# Patient Record
Sex: MALE | Race: WHITE | NOT HISPANIC OR LATINO | Employment: FULL TIME | URBAN - METROPOLITAN AREA
[De-identification: names, ages, dates, MRNs, and addresses within clinical notes are randomized per-mention and may not be internally consistent; named-entity substitution may affect disease eponyms.]

---

## 2019-08-19 ENCOUNTER — OFFICE VISIT (OUTPATIENT)
Dept: URGENT CARE | Facility: PHYSICIAN GROUP | Age: 24
End: 2019-08-19
Payer: COMMERCIAL

## 2019-08-19 VITALS
SYSTOLIC BLOOD PRESSURE: 122 MMHG | WEIGHT: 150 LBS | OXYGEN SATURATION: 100 % | DIASTOLIC BLOOD PRESSURE: 70 MMHG | HEART RATE: 67 BPM | RESPIRATION RATE: 18 BRPM | BODY MASS INDEX: 24.11 KG/M2 | TEMPERATURE: 97.5 F | HEIGHT: 66 IN

## 2019-08-19 DIAGNOSIS — K12.0 CANKER SORE: ICD-10-CM

## 2019-08-19 PROCEDURE — 99203 OFFICE O/P NEW LOW 30 MIN: CPT | Performed by: NURSE PRACTITIONER

## 2019-08-19 NOTE — PROGRESS NOTES
"Subjective:      Jose Cabrera is a 24 y.o. male who presents with Sore (in [id number] )            HPI  Sores in mouth x 2 days. Denies fever, recent illness. Salt water this morning. Patient states probable cold sores. No NSAID use. Denies sore throat, nasal congestion, PND or cough.    PMH:  has no past medical history on file.  MEDS: No current outpatient medications on file.  ALLERGIES: No Known Allergies  SURGHX: History reviewed. No pertinent surgical history.  SOCHX:  reports that he has never smoked. He has never used smokeless tobacco. He reports that he drank alcohol.  FH: Family history was reviewed, no pertinent findings to report    Review of Systems   Constitutional: Negative for chills, fever and malaise/fatigue.   HENT: Negative for congestion, ear pain and sore throat.    Eyes: Negative for discharge and redness.   Respiratory: Negative for cough, shortness of breath and wheezing.    Gastrointestinal: Negative for abdominal pain, constipation, diarrhea, nausea and vomiting.   Musculoskeletal: Negative for myalgias and neck pain.   Skin: Negative for itching and rash.   Neurological: Negative for dizziness, weakness and headaches.   Endo/Heme/Allergies: Negative for environmental allergies.   All other systems reviewed and are negative.         Objective:     /70   Pulse 67   Temp 36.4 °C (97.5 °F) (Temporal)   Resp 18   Ht 1.676 m (5' 6\")   Wt 68 kg (150 lb)   SpO2 100%   BMI 24.21 kg/m²      Physical Exam   Constitutional: He is oriented to person, place, and time. Vital signs are normal. He appears well-developed and well-nourished. He is active and cooperative.  Non-toxic appearance. He does not have a sickly appearance. He does not appear ill. No distress.   HENT:   Head: Normocephalic.   Nose: Nose normal.   Mouth/Throat: Uvula is midline, oropharynx is clear and moist and mucous membranes are normal. Oral lesions present. Uvula swelling present. No oropharyngeal exudate, " posterior oropharyngeal edema or posterior oropharyngeal erythema.       Redness, swelling with canker at center of uvula.   Eyes: Pupils are equal, round, and reactive to light. Conjunctivae and EOM are normal.   Neck: Normal range of motion. Neck supple.   Cardiovascular: Normal rate.   Pulmonary/Chest: Effort normal.   Musculoskeletal: Normal range of motion.   Neurological: He is alert and oriented to person, place, and time.   Skin: Skin is warm and dry. He is not diaphoretic.   Vitals reviewed.              Assessment/Plan:     1. Canker sore    Declines antiviral, kenalog orabase  Increase water intake  May use Ibuprofen/Tylenol prn for any fever, body aches or throat pain  May take long acting antihistamine for seasonal allergy symptoms prn  May use saline nasal spray/paddy pot for nasal congestion prn  May use Nasacort/Flonase prn for nasal congestion  May use throat lozenges for throat discomfort  May gargle with salt water prn for throat discomfort  May drink smoothies for nutrition if too painful to swallow solid foods  Monitor for any sinus pain/pressure with sinus congestion with thick mucus production and HA, cough, SOB, fever- need re-evaluation

## 2019-08-22 ASSESSMENT — ENCOUNTER SYMPTOMS
WHEEZING: 0
CHILLS: 0
FEVER: 0
CONSTIPATION: 0
EYE REDNESS: 0
HEADACHES: 0
SORE THROAT: 0
WEAKNESS: 0
MYALGIAS: 0
EYE DISCHARGE: 0
SHORTNESS OF BREATH: 0
VOMITING: 0
DIARRHEA: 0
COUGH: 0
DIZZINESS: 0
NAUSEA: 0
NECK PAIN: 0
ABDOMINAL PAIN: 0

## 2023-09-27 ENCOUNTER — OFFICE VISIT (OUTPATIENT)
Dept: URGENT CARE | Facility: PHYSICIAN GROUP | Age: 28
End: 2023-09-27
Payer: COMMERCIAL

## 2023-09-27 VITALS
SYSTOLIC BLOOD PRESSURE: 108 MMHG | HEIGHT: 66 IN | DIASTOLIC BLOOD PRESSURE: 80 MMHG | HEART RATE: 64 BPM | BODY MASS INDEX: 26.68 KG/M2 | WEIGHT: 166 LBS | OXYGEN SATURATION: 98 % | RESPIRATION RATE: 16 BRPM | TEMPERATURE: 97.8 F

## 2023-09-27 DIAGNOSIS — H44.001 INFECTION OF RIGHT EYE: ICD-10-CM

## 2023-09-27 PROCEDURE — 99203 OFFICE O/P NEW LOW 30 MIN: CPT | Performed by: FAMILY MEDICINE

## 2023-09-27 PROCEDURE — 3074F SYST BP LT 130 MM HG: CPT | Performed by: FAMILY MEDICINE

## 2023-09-27 PROCEDURE — 3079F DIAST BP 80-89 MM HG: CPT | Performed by: FAMILY MEDICINE

## 2023-09-27 RX ORDER — MOXIFLOXACIN 5 MG/ML
1 SOLUTION/ DROPS OPHTHALMIC 3 TIMES DAILY
Qty: 1 ML | Refills: 0 | Status: SHIPPED | OUTPATIENT
Start: 2023-09-27 | End: 2023-10-02

## 2023-09-27 ASSESSMENT — ENCOUNTER SYMPTOMS
EYE REDNESS: 1
EYE DISCHARGE: 1

## 2023-09-27 NOTE — LETTER
September 27, 2023         Patient: Jose Cabrera   YOB: 1995   Date of Visit: 9/27/2023           To Whom it May Concern:    Jose Cabrera was seen in my clinic on 9/27/2023. He may return to work in 1-2 days.    If you have any questions or concerns, please don't hesitate to call.        Sincerely,           Pedro Smalls M.D.  Electronically Signed

## 2023-09-27 NOTE — PROGRESS NOTES
"Subjective     Jose Cabrera is a 28 y.o. male who presents with Eye Problem (Possible pink eye x 2 days.)      - This is a very pleasant 28 y.o. who has come to the walk-in clinic today for ~2-3 days Rt eye irritation. No trauma, vision change or light sensitivity or use contact lenses. Crusty draining           ALLERGIES:  Patient has no known allergies.     PMH:  History reviewed. No pertinent past medical history.     PSH:  History reviewed. No pertinent surgical history.    MEDS:    Current Outpatient Medications:     moxifloxacin (VIGAMOX) 0.5 % Solution, Administer 1 Drop into both eyes 3 times a day for 5 days., Disp: 1 mL, Rfl: 0    ** I have documented what I find to be significant in regards to past medical, social, family and surgical history  in my HPI or under PMH/PSH/FH review section, otherwise it is noncontributory **         HPI    Review of Systems   Eyes:  Positive for discharge and redness.   All other systems reviewed and are negative.             Objective     /80 (BP Location: Left arm, Patient Position: Sitting, BP Cuff Size: Adult long)   Pulse 64   Temp 36.6 °C (97.8 °F) (Temporal)   Resp 16   Ht 1.676 m (5' 6\")   Wt 75.3 kg (166 lb)   SpO2 98%   BMI 26.79 kg/m²      Physical Exam  Vitals and nursing note reviewed.   Constitutional:       General: He is not in acute distress.     Appearance: Normal appearance. He is well-developed.   HENT:      Head: Normocephalic.   Eyes:      Comments: Rt eye:      Injection, some clear dc and a little lid crusting/dc. No obvious fb/abrasions. No photophobia. EOMI. Anterior chamber appears unremarkable    Pulmonary:      Effort: Pulmonary effort is normal. No respiratory distress.   Neurological:      Mental Status: He is alert.      Motor: No abnormal muscle tone.   Psychiatric:         Mood and Affect: Mood normal.         Behavior: Behavior normal.                             Assessment & Plan     1. Infection of right eye  moxifloxacin " (VIGAMOX) 0.5 % Solution          - Dx, plan & d/c instructions discussed   - warm compresses       Follow up with your regular primary care providers office in a week for a recheck on today's visit. ER if not improving in 2-3 days or if feeling/getting worse.    Any realistic side effects of medications that may have been given today reviewed.     Patient left in stable condition       Pertinent prior lab work and/or imaging studies in Epic have been reviewed by me today on day of this visit and taken into account for my treatment and plan today    Pertinent PMH/PSH and/or chronic conditions and medications if any were reviewed today and taken into account for my treatment and plan today    Pertinent prior office visit notes in [facility] have been reviewed by me today on day of this visit.    Please note that this dictation may have been created using voice recognition software, if so I have made every reasonable attempt to correct obvious errors, but I expect that there are errors of grammar and possibly content that I did not discover before finalizing the note.

## 2023-10-23 ENCOUNTER — HOSPITAL ENCOUNTER (OUTPATIENT)
Dept: RADIOLOGY | Facility: MEDICAL CENTER | Age: 28
End: 2023-10-23
Attending: ORTHOPAEDIC SURGERY
Payer: COMMERCIAL

## 2023-10-23 DIAGNOSIS — M79.672 LEFT FOOT PAIN: ICD-10-CM

## 2023-10-23 DIAGNOSIS — S93.325A LISFRANC DISLOCATION, LEFT, INITIAL ENCOUNTER: ICD-10-CM

## 2023-10-23 PROCEDURE — 73700 CT LOWER EXTREMITY W/O DYE: CPT | Mod: LT

## 2023-10-25 PROBLEM — S93.325D LISFRANC DISLOCATION, LEFT, SUBSEQUENT ENCOUNTER: Status: ACTIVE | Noted: 2023-10-25

## 2025-02-07 ENCOUNTER — OCCUPATIONAL MEDICINE (OUTPATIENT)
Dept: URGENT CARE | Facility: PHYSICIAN GROUP | Age: 30
End: 2025-02-07
Payer: COMMERCIAL

## 2025-02-07 ENCOUNTER — HOSPITAL ENCOUNTER (OUTPATIENT)
Dept: RADIOLOGY | Facility: MEDICAL CENTER | Age: 30
End: 2025-02-07
Attending: REGISTERED NURSE
Payer: OTHER MISCELLANEOUS

## 2025-02-07 VITALS
DIASTOLIC BLOOD PRESSURE: 66 MMHG | HEART RATE: 80 BPM | TEMPERATURE: 97.9 F | RESPIRATION RATE: 18 BRPM | WEIGHT: 164.24 LBS | BODY MASS INDEX: 25.78 KG/M2 | HEIGHT: 67 IN | SYSTOLIC BLOOD PRESSURE: 108 MMHG | OXYGEN SATURATION: 98 %

## 2025-02-07 DIAGNOSIS — S90.01XA CONTUSION OF RIGHT ANKLE, INITIAL ENCOUNTER: ICD-10-CM

## 2025-02-07 DIAGNOSIS — T14.8XXA ABRASION: ICD-10-CM

## 2025-02-07 DIAGNOSIS — S99.911A INJURY OF RIGHT ANKLE, INITIAL ENCOUNTER: ICD-10-CM

## 2025-02-07 PROCEDURE — 3078F DIAST BP <80 MM HG: CPT | Performed by: REGISTERED NURSE

## 2025-02-07 PROCEDURE — 99213 OFFICE O/P EST LOW 20 MIN: CPT | Performed by: REGISTERED NURSE

## 2025-02-07 PROCEDURE — 73610 X-RAY EXAM OF ANKLE: CPT | Mod: RT

## 2025-02-07 PROCEDURE — 3074F SYST BP LT 130 MM HG: CPT | Performed by: REGISTERED NURSE

## 2025-02-07 NOTE — LETTER
PHYSICIAN’S AND CHIROPRACTIC PHYSICIAN'S   PROGRESS REPORT   CERTIFICATION OF DISABILITY Claim Number:     Social Security Number:    Patient’s Name: ZARIA MORALES Date of Injury: 2/7/2025   Employer:  [employer] Name of [employer] (if applicable):      Patient’s Job Description/Occupation: Service Dept       Previous Injuries/Diseases/Surgeries Contributing to the Condition:         Diagnosis: (S90.01XA) Contusion of right ankle, initial encounter  (T14.8XXA) Abrasion  (S99.911A) Injury of right ankle, initial encounter      Related to the Industrial Injury? Yes     Explain:        Objective Medical Findings: Right ankle: TTP medial malleoli, bruising, swelling abrasion. Normal ROM right ankle and digits. NVID.         None - Discharged                         Stable  Yes                 Ratable  Yes        Generally Improved                         Condition Worsened                  Condition Same  May Have Suffered a Permanent Disability No     Treatment Plan:    RICE therapy.  OTC analgesics.         No Change in Therapy                  PT/OT Prescribed                      Medication May be Used While Working        Case Management                          PT/OT Discontinued    Consultation    Further Diagnostic Studies:    Prescription(s)                 Released to FULL DUTY /No Restrictions on (Date):       Certified TOTALLY TEMPORARILY DISABLED (Indicate Dates) From:   To:    X  Released to RESTRICTED/Modified Duty on (Date): From: 2/7/2025 To: 2/10/2025  Restrictions Are:  Temporary      No Sitting    No Standing    No Pulling Other: No lifting pushing pulling carrying more than 10 pounds.  No standing for more than 2 hours without a 5 to 10-minute break.       No Bending at Waist     No Stooping     No Lifting        No Carrying     No Walking Lifting Restricted to (lbs.):          No Pushing        No Climbing     No Reaching Above Shoulders       Date of Next Visit:  2/10/2025 Date of this Exam:  2/7/2025 Physician/Chiropractic Physician Name: LYNN Lopez Physician/Chiropractic Physician Signature:  Stiven Hernandez DO MPH     [place]:  [facility]  [facility], [address] [place], [place] [address] - Telephone [phone] [place]:  [address] [place] [address], [address] [place], [place] [address] - Telephone [phone]    [email or web address]  D-39 (Rev. 10/24)

## 2025-02-07 NOTE — LETTER
"    EMPLOYEE’S CLAIM FOR COMPENSATION/ REPORT OF INITIAL TREATMENT  FORM C-4  PLEASE TYPE OR PRINT    EMPLOYEE’S CLAIM - PROVIDE ALL INFORMATION REQUESTED   First Name                    LANETTE Garcia                  Last Name  Cabrera Birthdate                    1995                Sex  Male Claim Number (Insurer’s Use Only)     Home Address  [address] Age  29 y.o. 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 (if applicable)  Moving a unit    How did this injury or occupational disease occur? (Be specific and answer in detail. Use additional sheet if necessary)  Moving unit hit a crackin concrete fell over couldn't stop it, slid down my ankle   If you believe that you have an occupational disease, when did you first have knowledge of the disability and its relationship to your employment?  n/a Witnesses to the Accident (if applicable)  none      Nature of Injury or Occupational Disease  Workers' Compensation  Part(s) of Body Injured or Affected  Ankle (R)      I " CERTIFY THAT THE ABOVE IS TRUE AND CORRECT TO T HE BEST OF MY KNOWLEDGE AND THAT I HAVE PROVIDED THIS INFORMATION IN ORDER TO OBTAIN THE BENEFITS OF [place], OR CHAPTER [place]RS).  I HEREBY AUTHORIZE ANY PHYSICIAN, CHIROPRACTOR, SURGEON, PRACTITIONER OR ANY OTHER PERSON, ANY HOSPITAL, INCLUDING [facility] OR [facility], ANY  MEDICAL SERVICE ORGANIZATION, ANY INSURANCE COMPANY, OR OTHER INSTITUTION OR ORGANIZATION TO RELEASE TO EACH OTHER, ANY MEDICAL OR OTHER INFORMATION, INCLUDING BENEFITS PAID OR PAYABLE, PERTINENT TO THIS INJURY OR DISEASE, EXCEPT INFORMATION RELATIVE TO DIAGNOSIS, TREATMENT AND/OR COUNSELING FOR AIDS, PSYCHOLOGICAL CONDITIONS, ALCOHOL OR CONTROLLED SUBSTANCES, FOR WHICH I MUST GIVE SPECIFIC AUTHORIZATION.  A PHOTOSTAT OF THIS AUTHORIZATION SHALL BE VALID AS THE ORIGINAL.     Date    02/07/25   Place   [facility]  Employee’s Original or  *Electronic Signature   THIS REPORT MUST BE COMPLETED AND MAILED WITHIN 3 WORKING DAYS OF TREATMENT   Place  [facility]    Name of Facility  [place]   Date 2/7/2025 Diagnosis and Description of Injury or Occupational Disease  (S90.01XA) Contusion of right ankle, initial encounter  (T14.8XXA) Abrasion  (S99.911A) Injury of right ankle, initial encounter  Diagnoses of Contusion of right ankle, initial encounter, Abrasion, and Injury of right ankle, initial encounter were pertinent to this visit. Is there evidence that the injured employee was under the influence of alcohol and/or another controlled substance at the time of accident?  []No  [] Yes (if yes, please explain)   Hour 9:02 AM      Treatment: RICE therapy.  OTC analgesics    Have you advised the patient to remain off work five days or more?   [] Yes  [] No        No           If yes, indicate dates: From   To      If no, is the injured employee capable of: [] full duty No   [] modified  duty Yes  If yes to modified duty, specify any limitations / restrictions:   No lifting pushing pulling carrying more than 10 pounds.  No standing for more than 2 hours without a 5 to 10-minute break.   X-Ray Findings: Negative    From information given by the employee, together with medical evidence, can you directly connect this injury or occupational disease as job incurred?  []Yes   [] No Yes    Is additional medical care by a physician indicated? []Yes [] No  Yes    Do you know of any previous injury or disease contributing to this condition or occupational disease? []Yes [] No (Explain if yes)                          No   Date  2/7/2025 Print Health Care Provider’s Name  LYNN Lopez I certify that the employer’s copy of  this form was delivered to the employer on:   Address  [address] INSURER'S USE ONLY                       [place]  [address] Provider’s Tax ID Number  [id number]   Telephone  Dept: [phone]    Health Care Provider’s Original or Electronic Signature  e-PEPE Nguyen Degree (MD,DO, DC,PA-C,APRN)  APRN  Choose (if applicable)      ORIGINAL - TREATING HEALTHCARE PROVIDER PAGE 2 - INSURER/TPA PAGE 3 - EMPLOYER PAGE 4 - EMPLOYEE             Form C-4 (rev.08/23)

## 2025-02-07 NOTE — PROGRESS NOTES
"Subjective:     Jose Cabrera is a 29 y.o. male who presents for Other (New wc doi: 02/07/2025 right ankle, dropped a washer unit on his right ankle, )           DOI: 02/07/25    Past injury that could be related: no    YANETH: roughly 300 lbs unit fell and slid down the medial right ankle region. Immediate swelling. Is able to walk. Did not feel crack/pop/tear. No numbness or tingling. Not on blood thinners.        PMH:   No pertinent past medical history to this problem  MEDS:  Medications were reviewed in EMR  ALLERGIES:  Allergies were reviewed in EMR  FH:   No pertinent family history to this problem       Objective:     /66   Pulse 80   Temp 36.6 °C (97.9 °F) (Temporal)   Resp 18   Ht 1.702 m (5' 7\")   Wt 74.5 kg (164 lb 3.9 oz)   SpO2 98%   BMI 25.72 kg/m²     Right ankle: TTP medial malleoli, bruising, swelling abrasion. Normal ROM right ankle and digits. NVID.      RADIOLOGY RESULTS   DX-ANKLE 3+ VIEWS RIGHT    Result Date: 2/7/2025 2/7/2025 9:50 AM HISTORY/REASON FOR EXAM:  Pain/Deformity Following Trauma. RIGHT ankle crush injury, swelling TECHNIQUE/EXAM DESCRIPTION AND NUMBER OF VIEWS:  3 views of the RIGHT ankle. COMPARISON: None. FINDINGS: Diffuse soft tissue swelling at the medial aspect distal lower leg and ankle. No soft tissue gas or radiopaque foreign body. Mortise is congruent. Bone island within the calcaneus. No fracture or dislocation.     1.  No fracture or dislocation of RIGHT ankle. 2.  Diffuse medial soft tissue swelling.           Assessment/Plan:       1. Contusion of right ankle, initial encounter    2. Abrasion    3. Injury of right ankle, initial encounter  - DX-ANKLE 3+ VIEWS RIGHT    Se forms    Differential diagnosis, natural history, supportive care, and indications for immediate follow-up discussed.    "

## 2025-02-10 ENCOUNTER — OCCUPATIONAL MEDICINE (OUTPATIENT)
Dept: URGENT CARE | Facility: PHYSICIAN GROUP | Age: 30
End: 2025-02-10
Payer: COMMERCIAL

## 2025-02-10 VITALS
HEIGHT: 67 IN | DIASTOLIC BLOOD PRESSURE: 72 MMHG | HEART RATE: 80 BPM | OXYGEN SATURATION: 98 % | WEIGHT: 165.34 LBS | TEMPERATURE: 96.8 F | SYSTOLIC BLOOD PRESSURE: 114 MMHG | RESPIRATION RATE: 16 BRPM | BODY MASS INDEX: 25.95 KG/M2

## 2025-02-10 DIAGNOSIS — S99.911D INJURY OF RIGHT ANKLE, SUBSEQUENT ENCOUNTER: ICD-10-CM

## 2025-02-10 DIAGNOSIS — T14.8XXA ABRASION: ICD-10-CM

## 2025-02-10 DIAGNOSIS — S90.01XD CONTUSION OF RIGHT ANKLE, SUBSEQUENT ENCOUNTER: ICD-10-CM

## 2025-02-10 PROCEDURE — 99213 OFFICE O/P EST LOW 20 MIN: CPT | Performed by: PHYSICIAN ASSISTANT

## 2025-02-10 PROCEDURE — 3078F DIAST BP <80 MM HG: CPT | Performed by: PHYSICIAN ASSISTANT

## 2025-02-10 PROCEDURE — 3074F SYST BP LT 130 MM HG: CPT | Performed by: PHYSICIAN ASSISTANT

## 2025-02-10 NOTE — PROGRESS NOTES
"Miryam Cabrera is a 29 y.o. male who presents with Ankle Injury (R foot/ankle swelling following dropping equipment on foot. Improved since being seen on Friday.)    Pt PMH, SocHx, SurgHx, FamHx, Drug allergies and medications reviewed with pt/EPIC.      Family history reviewed, it is not pertinent to this complaint.           Patient presents with: Work comp follow-up second visit  Ankle Injury: R foot/ankle swelling following dropping equipment on foot. Improved since being seen on Friday.  Patient has been taking over-the-counter ibuprofen and following RICE treatment protocols.  Patient states his ankle is improved but feels he needs to keep his restrictions in place for the next week.        Ankle Injury         Review of Systems   All other systems reviewed and are negative.             Objective     /72 (BP Location: Right arm, Patient Position: Sitting, BP Cuff Size: Adult long)   Pulse 80   Temp 36 °C (96.8 °F) (Temporal)   Resp 16   Ht 1.702 m (5' 7\")   Wt 75 kg (165 lb 5.5 oz)   SpO2 98%   BMI 25.90 kg/m²      Physical Exam  Vitals and nursing note reviewed.   Constitutional:       General: He is not in acute distress.     Appearance: Normal appearance. He is well-developed. He is not ill-appearing or toxic-appearing.   HENT:      Head: Normocephalic and atraumatic.      Nose: Nose normal.      Mouth/Throat:      Lips: Pink.      Mouth: Mucous membranes are moist.      Pharynx: Oropharynx is clear. Uvula midline.   Eyes:      Extraocular Movements: Extraocular movements intact.      Conjunctiva/sclera: Conjunctivae normal.      Pupils: Pupils are equal, round, and reactive to light.   Cardiovascular:      Rate and Rhythm: Normal rate and regular rhythm.      Pulses: Normal pulses.   Pulmonary:      Effort: Pulmonary effort is normal.   Abdominal:      General: Bowel sounds are normal.      Palpations: Abdomen is soft.   Musculoskeletal:         General: Normal range of motion. "      Cervical back: Normal range of motion and neck supple.        Feet:    Skin:     General: Skin is warm and dry.      Capillary Refill: Capillary refill takes less than 2 seconds.   Neurological:      General: No focal deficit present.      Mental Status: He is alert and oriented to person, place, and time.      Cranial Nerves: No cranial nerve deficit.      Motor: Motor function is intact.      Coordination: Coordination is intact.      Gait: Gait normal.   Psychiatric:         Mood and Affect: Mood normal.         R ankle exam: moderate swelling and bruising to medial malleolus with scab overlying skin.  Decreased ROM secondary to pain and swelling.  Distal neuro/vascular intact.                    Assessment & Plan        Assessment & Plan  Contusion of right ankle, subsequent encounter         Abrasion         Injury of right ankle, subsequent encounter                   Patient to continue RICE treatment protocols, OTC Motrin 600 mg 3 times daily as needed for pain and swelling.    Patient to follow D39 restrictions.    Return to clinic in 1 week, anticipate MMI at that time.    Differential diagnosis, supportive care, and indications for immediate follow-up discussed with patient.  Instructed to return to clinic or nearest emergency department for any change in condition, further concerns, or worsening of symptoms.    I personally reviewed prior external notes and test results pertinent to today's visit.  I have independently reviewed and interpreted all diagnostics ordered during this urgent care visit.    PT should follow up with PCP in 1-2 days for re-evaluation if symptoms have not improved.      Discussed red flags and reasons to return to UC or ED.      Pt and/or family verbalized understanding of diagnosis and follow up instructions and was offered informational handout on diagnosis.  PT discharged.     Please note that this dictation was created using voice recognition software. I have made every  reasonable attempt to correct obvious errors, but I expect that there may be errors of grammar and possibly content that I did not discover before finalizing the note.

## 2025-02-10 NOTE — LETTER
PHYSICIAN’S AND CHIROPRACTIC PHYSICIAN'S   PROGRESS REPORT   CERTIFICATION OF DISABILITY Claim Number:     Social Security Number:    Patient’s Name: ZARIA MORALES Date of Injury: 2/7/2025   Employer:   Name of [employer] (if applicable):      Patient’s Job Description/Occupation: Service Dept       Previous Injuries/Diseases/Surgeries Contributing to the Condition:         Diagnosis: (S90.01XD) Contusion of right ankle, subsequent encounter  (T14.8XXA) Abrasion  (S99.911D) Injury of right ankle, subsequent encounter      Related to the Industrial Injury? Yes     Explain: Employer's Name:  [unfilled]    @CC@    Date of Injury:  2/7/2025    Mechanism of Injury:  Moving unit hit a crack in concrete fell over couldn't stop it,slid down my ankle  Moving a unit  @CUIENC([id number])@            Location of Injury:  Workers' Compensation, Ankle (R)         Objective Medical Findings: R ankle exam: moderate swelling and bruising to medial malleolus with scab overlying skin.  Decreased ROM secondary to pain and swelling.  Distal neuro/vascular intact.          None - Discharged                         Stable  No                 Ratable  No     X   Generally Improved                         Condition Worsened                  Condition Same  May Have Suffered a Permanent Disability No     Treatment Plan:    Continue RICE treatments protocols.  OTC nsaids as needed for pain and swelling.           No Change in Therapy                  PT/OT Prescribed                      Medication May be Used While Working        Case Management                          PT/OT Discontinued    Consultation    Further Diagnostic Studies:    Prescription(s)                 Released to FULL DUTY /No Restrictions on (Date):       Certified TOTALLY TEMPORARILY DISABLED (Indicate Dates) From:   To:    X  Released to RESTRICTED/Modified Duty on (Date): From: 2/10/2025 To: 2/17/2025  Restrictions Are:         No Sitting    No Standing X   No  Pulling Other: Walking limited to 2 hours per shift.         No Bending at Waist     No Stooping X    No Lifting    X    No Carrying X    No Walking Lifting Restricted to (lbs.):  < or = to 10 pounds    X   No Pushing     X   No Climbing     No Reaching Above Shoulders       Date of Next Visit:    Date of this Exam: 2/10/2025 Physician/Chiropractic Physician Name: Halie Drake P.A.-C. Physician/Chiropractic Physician Signature:  Stiven Hernandez DO MPH     [place]:  [address] [place] [address], [address] [place], [place] [address] - Telephone [phone] [place]:  [address] [place] [address], [address] [place], [place] [address] - Telephone [phone]    [email or web address]  D-39 (Rev. 10/24)

## 2025-02-17 ENCOUNTER — OCCUPATIONAL MEDICINE (OUTPATIENT)
Dept: URGENT CARE | Facility: PHYSICIAN GROUP | Age: 30
End: 2025-02-17
Payer: COMMERCIAL

## 2025-02-17 VITALS
OXYGEN SATURATION: 98 % | BODY MASS INDEX: 26.3 KG/M2 | DIASTOLIC BLOOD PRESSURE: 88 MMHG | HEART RATE: 88 BPM | WEIGHT: 167.55 LBS | SYSTOLIC BLOOD PRESSURE: 126 MMHG | TEMPERATURE: 97.3 F | HEIGHT: 67 IN | RESPIRATION RATE: 17 BRPM

## 2025-02-17 DIAGNOSIS — S90.01XD CONTUSION OF RIGHT ANKLE, SUBSEQUENT ENCOUNTER: ICD-10-CM

## 2025-02-17 DIAGNOSIS — S99.911D INJURY OF RIGHT ANKLE, SUBSEQUENT ENCOUNTER: ICD-10-CM

## 2025-02-17 PROCEDURE — 99213 OFFICE O/P EST LOW 20 MIN: CPT | Performed by: STUDENT IN AN ORGANIZED HEALTH CARE EDUCATION/TRAINING PROGRAM

## 2025-02-17 PROCEDURE — 3074F SYST BP LT 130 MM HG: CPT | Performed by: STUDENT IN AN ORGANIZED HEALTH CARE EDUCATION/TRAINING PROGRAM

## 2025-02-17 PROCEDURE — 3079F DIAST BP 80-89 MM HG: CPT | Performed by: STUDENT IN AN ORGANIZED HEALTH CARE EDUCATION/TRAINING PROGRAM

## 2025-02-17 RX ORDER — MUPIROCIN 20 MG/G
1 OINTMENT TOPICAL 2 TIMES DAILY
Qty: 22 G | Refills: 0 | Status: SHIPPED | OUTPATIENT
Start: 2025-02-17

## 2025-02-17 NOTE — PROGRESS NOTES
"Subjective:     Jose Cabrera is a 29 y.o. male who presents for Foot Injury (Foot injury 2/7, had been improving last week, but now feels tightness in achilles. No swelling.)    DOI 2/7 : YANETH: roughly 300 lbs unit fell and slid down the medial right ankle region. Immediate swelling. Is able to walk. Did not feel crack/pop/tear. No numbness or tingling. Not on blood thinners.     2/17 FU: Minimal improvement over the past week. At end of work week on Friday was very stiff and sore. Started wearing a new brace this weekend.        Objective:     /88 (BP Location: Left arm, Patient Position: Sitting, BP Cuff Size: Adult long)   Pulse 88   Temp 36.3 °C (97.3 °F) (Temporal)   Resp 17   Ht 1.702 m (5' 7\")   Wt 76 kg (167 lb 8.8 oz)   SpO2 98%   BMI 26.24 kg/m²     Constitutional: Patient is in no acute distress. Appears well-developed and well-nourished.   Cardiovascular: Normal rate.    Pulmonary/Chest: Effort normal. No respiratory distress.   Neurological: Patient is alert and oriented to person, place, and time.   Skin: Skin is warm and dry.   Psychiatric: Normal mood and affect. Behavior is normal.     TTP Medial malleolus.  Wound is healing but now with increased errythema over the lateral maleolus. No sig discharge. ROM is limited 2/2 to Discomfort and tightness.     Assessment/Plan:     1. Injury of right ankle, subsequent encounter    2. Contusion of right ankle, subsequent encounter      Will add additional bactroban ointment to be applied to R Medial ankle wound. Return precautions discussed for signs of worsening infection.   Continue to wear Ankle brace.   Walking as tolerated.   ROM exercises as tolerated.     Work Status: Restricted Duty - see D-39 or other state/federal worker's compensation forms for specific restrictions if applicable  Follow up 5 days    Differential diagnosis, natural history, supportive care, and indications for immediate follow-up discussed.    Approximately 25 " minutes were spent in reviewing notes, preparing for visit, obtaining history, exam and evaluation, patient counseling/education, and post visit documentation/orders. Significant time was spent completing state/federal worker's compensation forms.

## 2025-02-17 NOTE — LETTER
PHYSICIAN’S AND CHIROPRACTIC PHYSICIAN'S   PROGRESS REPORT   CERTIFICATION OF DISABILITY Claim Number:     Social Security Number:    Patient’s Name: Jose Cabrera Date of Injury: 2/7/2025   Employer:  [employer] Name of MCO (if applicable):      Patient’s Job Description/Occupation: Service Dept       Previous Injuries/Diseases/Surgeries Contributing to the Condition:         Diagnosis: (S99.911D) Injury of right ankle, subsequent encounter  (S90.01XD) Contusion of right ankle, subsequent encounter      Related to the Industrial Injury? Yes     Explain: YANETH Unit fell and slid down medial right ankle. Continued Ttp and discomfort with walking.      Objective Medical Findings: TTP Medial malleolus.  Wound is healing but now with increased errythema over the lateral maleolus. No sig discharge. ROM is limited 2/2 to Discomfort and tightness.          None - Discharged                         Stable  No                 Ratable  No        Generally Improved                         Condition Worsened               X   Condition Same  May Have Suffered a Permanent Disability No     Treatment Plan:    Will add additional bactroban ointment to be applied to R Medial ankle wound. Return precautions discussed for signs of worsening infection.   Continue to wear Ankle brace.   Walking as tolerated.   ROM exercises as tolerated.   OTC NSAIDS.          No Change in Therapy                  PT/OT Prescribed                      Medication May be Used While Working        Case Management                          PT/OT Discontinued    Consultation    Further Diagnostic Studies:    Prescription(s)           Bactroban Ointment.      Released to FULL DUTY /No Restrictions on (Date):       Certified TOTALLY TEMPORARILY DISABLED (Indicate Dates) From:   To:    X  Released to RESTRICTED/Modified Duty on (Date): From: 2/17/2025 To: 2/24/2025  Restrictions Are:  Temporary      No Sitting    No Standing    No Pulling Other: [place]  limited to 2 hours per shift. Allow breaks when standing. No lifting wor carrying greater than 15 lbs.        No Bending at Waist     No Stooping     No Lifting        No Carrying     No Walking Lifting Restricted to (lbs.):          No Pushing     X   No Climbing     No Reaching Above Shoulders       Date of Next Visit:  2/21/2025 Date of this Exam: 2/17/2025 Physician/Chiropractic Physician Name: Jhon Diaz M.D. Physician/Chiropractic Physician Signature:  Stiven Hernandez DO MPH     [place]:  [address] [place] [address], [address] [place], [place] [address] - Telephone [phone] [place]:  [address] [place] [address], [address] [place], [place] [address] - Telephone [phone]    [email or web address]  D-39 (Rev. 10/24)

## 2025-02-21 ENCOUNTER — OCCUPATIONAL MEDICINE (OUTPATIENT)
Dept: URGENT CARE | Facility: PHYSICIAN GROUP | Age: 30
End: 2025-02-21
Payer: COMMERCIAL

## 2025-02-21 VITALS
TEMPERATURE: 98.2 F | SYSTOLIC BLOOD PRESSURE: 112 MMHG | HEIGHT: 67 IN | BODY MASS INDEX: 26.21 KG/M2 | HEART RATE: 76 BPM | WEIGHT: 167 LBS | RESPIRATION RATE: 16 BRPM | OXYGEN SATURATION: 98 % | DIASTOLIC BLOOD PRESSURE: 74 MMHG

## 2025-02-21 DIAGNOSIS — S90.01XD CONTUSION OF RIGHT ANKLE, SUBSEQUENT ENCOUNTER: ICD-10-CM

## 2025-02-21 DIAGNOSIS — T14.8XXA ABRASION: ICD-10-CM

## 2025-02-21 PROCEDURE — 3078F DIAST BP <80 MM HG: CPT | Performed by: PHYSICIAN ASSISTANT

## 2025-02-21 PROCEDURE — 99213 OFFICE O/P EST LOW 20 MIN: CPT | Performed by: PHYSICIAN ASSISTANT

## 2025-02-21 PROCEDURE — 3074F SYST BP LT 130 MM HG: CPT | Performed by: PHYSICIAN ASSISTANT

## 2025-02-21 ASSESSMENT — ENCOUNTER SYMPTOMS
SENSORY CHANGE: 0
TINGLING: 1
FEVER: 0
FALLS: 0

## 2025-02-21 NOTE — LETTER
PHYSICIAN’S AND CHIROPRACTIC PHYSICIAN'S   PROGRESS REPORT   CERTIFICATION OF DISABILITY Claim Number:     Social Security Number:    Patient’s Name: Jose Cabrera Date of Injury: 2/7/2025   Employer:    [employer] Name of MCO (if applicable):      Patient’s Job Description/Occupation: Service Dept       Previous Injuries/Diseases/Surgeries Contributing to the Condition:   None noted.      Diagnosis: (S90.01XD) Contusion of right ankle, subsequent encounter  (T14.8XXA) Abrasion      Related to the Industrial Injury? Yes     Explain: YANETH Unit fell and slid down medial right ankle. Today- Visit #4-significant improvement in pain, swelling.  Patient continues to have abrasion to the inside portion of the ankle.  Denies new redness, swelling or drainage.  Localized pain to this area, attempted to walk without brace last night without discomfort.      Objective Medical Findings: Right ankle: Scabbed abrasion to the medial ankle with minimal swelling localized tenderness to this area.  Without tenderness to palpation although painful step up to the Achilles region and area of the scab.  Painful hopping on the right foot.  Neurovascular intact distally.  Without streaking or calf tenderness.         None - Discharged                         Stable  No                 Ratable  No     X   Generally Improved                         Condition Worsened                  Condition Same  May Have Suffered a Permanent Disability No     Treatment Plan:    Utilize mupirocin once a day attempt to keep wound open and uncovered.  No soaking.  Utilize ankle brace only as needed and continue with icing, elevation and NSAIDs.         No Change in Therapy                  PT/OT Prescribed                      Medication May be Used While Working        Case Management                          PT/OT Discontinued    Consultation    Further Diagnostic Studies:    Prescription(s)                 Released to FULL DUTY /No Restrictions on  (Date):       Certified TOTALLY TEMPORARILY DISABLED (Indicate Dates) From:   To:    X  Released to RESTRICTED/Modified Duty on (Date): From: 2/21/2025 To: 3/6/2025  Restrictions Are:         No Sitting    No Standing    No Pulling Other:         No Bending at Waist     No Stooping     No Lifting        No Carrying     No Walking Lifting Restricted to (lbs.):  < or = to 25 pounds       No Pushing     X   No Climbing     No Reaching Above Shoulders       Date of Next Visit:  3/6/2025  8 AM  Date of this Exam: 2/21/2025 Physician/Chiropractic Physician Name: Riley Reyna P.A.-C. Physician/Chiropractic Physician Signature:  Stiven Hernandez DO MPH     [place]:  [address] [place] [address], [address] [place], [place] [address] - Telephone [phone] [place]:  [address]  [facility], [address] [place], [place] [address] - Telephone [phone]    [email or web address]  D-39 (Rev. 10/24)

## 2025-02-21 NOTE — PROGRESS NOTES
"Subjective     Jose Cabrera is a 29 y.o. male who presents with Follow-Up ( [facility] DOI 02/07/25 (R)  foot pain; no pain, scab on foot )            HPI  Date of injury: 2 - 7 - 25-when a approximately 300 pound unit slid down his right ankle.  Patient was with normal x-rays.    Visit #4 today.  In general patient reports pain has significantly improved however abrasion continues to heal.  He has been utilizing the Bactroban twice a day and has had significant improvement with new brace.  Patient admits he was able to walk around his home without the brace last night without difficulty.  He continues to have slight pain near the abrasion site along with the Achilles area.  Swelling, pain all have improved.  No difficulty on light duty at this time.      Review of Systems   Constitutional:  Negative for fever.   Musculoskeletal:  Positive for joint pain. Negative for falls.   Neurological:  Positive for tingling. Negative for sensory change.          Right ankle pain-improved swelling  Right ankle abrasion-without drainage.      Objective     /74 (BP Location: Left arm, Patient Position: Sitting, BP Cuff Size: Adult)   Pulse 76   Temp 36.8 °C (98.2 °F) (Temporal)   Resp 16   Ht 1.702 m (5' 7\")   Wt 75.8 kg (167 lb)   SpO2 98%   BMI 26.16 kg/m²      Physical Exam    Right ankle: Scabbed abrasion to the medial ankle with minimal swelling localized tenderness to this area.  Without tenderness to palpation although painful step up to the Achilles region and area of the scab.  Painful hopping on the right foot.  Neurovascular intact distally.  Without streaking or calf tenderness.                        Assessment & Plan  Contusion of right ankle, subsequent encounter         Abrasion              Please see D39.  Encourage patient to only utilize mupirocin once a day and to keep scab-wound open to air at this time.  Also encouraged patient to utilize brace only as needed at work or at home.  Patient is to " recheck with us in 2 weeks mainly wanting to recheck to ensure that abrasion is healing.  I do not feel that oral antibiotics are needed at this time however patient was educated on signs and symptoms that would require patient to have recheck with us before 2 weeks.  Anticipate discharge in 2 weeks.    Please note that this dictation was created using voice recognition software. I have made every reasonable attempt to correct obvious errors, but I expect that there are errors of grammar and possibly content that I did not discover before finalizing the note.

## 2025-02-28 ENCOUNTER — TELEPHONE (OUTPATIENT)
Dept: OCCUPATIONAL MEDICINE | Facility: CLINIC | Age: 30
End: 2025-02-28
Payer: COMMERCIAL

## 2025-03-01 NOTE — TELEPHONE ENCOUNTER
LVM asking if patient wants to ASIF to [facility] on [facility]. [phone]

## 2025-03-06 ENCOUNTER — OCCUPATIONAL MEDICINE (OUTPATIENT)
Dept: URGENT CARE | Facility: PHYSICIAN GROUP | Age: 30
End: 2025-03-06
Payer: COMMERCIAL

## 2025-03-06 VITALS
TEMPERATURE: 97.3 F | BODY MASS INDEX: 25.81 KG/M2 | OXYGEN SATURATION: 97 % | HEART RATE: 64 BPM | WEIGHT: 164.46 LBS | HEIGHT: 67 IN | SYSTOLIC BLOOD PRESSURE: 118 MMHG | DIASTOLIC BLOOD PRESSURE: 72 MMHG | RESPIRATION RATE: 16 BRPM

## 2025-03-06 DIAGNOSIS — T14.8XXA ABRASION: ICD-10-CM

## 2025-03-06 DIAGNOSIS — S90.01XD CONTUSION OF RIGHT ANKLE, SUBSEQUENT ENCOUNTER: ICD-10-CM

## 2025-03-06 PROCEDURE — 3078F DIAST BP <80 MM HG: CPT | Performed by: PHYSICIAN ASSISTANT

## 2025-03-06 PROCEDURE — 3074F SYST BP LT 130 MM HG: CPT | Performed by: PHYSICIAN ASSISTANT

## 2025-03-06 PROCEDURE — 99213 OFFICE O/P EST LOW 20 MIN: CPT | Performed by: PHYSICIAN ASSISTANT

## 2025-03-06 ASSESSMENT — ENCOUNTER SYMPTOMS
MYALGIAS: 0
FEVER: 0

## 2025-03-06 NOTE — PROGRESS NOTES
"Subjective:     CHIEF COMPLAINT  Chief Complaint   Patient presents with    Follow-Up     [facility] DOI [id number] foot pain >90%  better      HPI:  Very pleasant 29-year-old male present to the clinic for follow-up regarding work-related injury.  Right ankle pain has significantly improved.  He has formed a nice scab that is healing appropriately over the medial aspect of the ankle.  He has not noted any discharge or drainage.  No surrounding redness.  Able to weight-bear and perform all movements without any limitation.  No questions or concerns at this time.    REVIEW OF SYSTEMS  Review of Systems   Constitutional:  Negative for fever.   Musculoskeletal:  Negative for joint pain and myalgias.       PAST MEDICAL HISTORY  Patient Active Problem List    Diagnosis Date Noted    Lisfranc dislocation, left, subsequent encounter 10/25/2023       SURGICAL HISTORY   has a past surgical history that includes open tx tarsal fracture xcp talus &calcaneus* (Left, 10/31/2023); remv bone for graft major (Left, 10/31/2023); and repair of hammertoe,one (Left, 10/31/2023).    ALLERGIES  No Known Allergies    CURRENT MEDICATIONS  Home Medications       Reviewed by Fred Cordoba P.A.-C. (Physician Assistant) on 03/06/25 at 0823  Med List Status: <None>     Medication Last Dose Status   mupirocin (BACTROBAN) 2 % Ointment PRN Active                    SOCIAL HISTORY  Social History     Tobacco Use    Smoking status: Never    Smokeless tobacco: Never   Vaping Use    Vaping status: Never Used   Substance and Sexual Activity    Alcohol use: Not Currently    Drug use: Yes     Types: Inhaled, Marijuana    Sexual activity: Not on file       FAMILY HISTORY  History reviewed. No pertinent family history.       Objective:     VITAL SIGNS: /72   Pulse 64   Temp 36.3 °C (97.3 °F) (Temporal)   Resp 16   Ht 1.702 m (5' 7\")   Wt 74.6 kg (164 lb 7.4 oz)   SpO2 97%   BMI 25.76 kg/m²     PHYSICAL EXAM  Physical Exam    Constitutional: Pt is " oriented to person, place, and time.  Appears well-developed and well-nourished. No distress.   Eyes: Conjunctivae are normal.   Cardiovascular: Normal rate.    Pulmonary/Chest: Effort normal.   Musculoskeletal: Right ankle: Healing scab to the medial malleolus.  No surrounding redness or signs of infection present.  No bony tenderness present.  Full and pain-free ankle range of motion.  Normal gait.  Neurological: Pt is alert and oriented to person, place, and time. Coordination normal.   Skin: Skin is warm. Pt is not diaphoretic. No erythema.   Psychiatric: Pt has a normal mood and affect.  Behavior is normal.     Assessment/Plan:     1. Contusion of right ankle, subsequent encounter    2. Abrasion      MDM/Comments:    Symptoms fully improved.  Wound is healed nicely without any signs of infection.  Able to perform all work duties without any complication.  Discharge MMI.    Differential diagnosis, natural history, supportive care, and indications for immediate follow-up discussed. All questions answered. Patient agrees with the plan of care.    Follow-up as needed if symptoms worsen or fail to improve to PCP, Urgent care or Emergency Room.    I have personally reviewed prior external notes and test results pertinent to today's visit.  I have independently reviewed and interpreted all diagnostics ordered during this urgent care acute visit.   Discussed management options (risks,benefits, and alternatives to treatment). Pt expresses understanding and the treatment plan was agreed upon. Questions were encouraged and answered to pt's satisfaction.    Please note that this dictation was created using voice recognition software. I have made a reasonable attempt to correct obvious errors, but I expect that there are errors of grammar and possibly content that I did not discover before finalizing the note.

## 2025-03-06 NOTE — LETTER
PHYSICIAN’S AND CHIROPRACTIC PHYSICIAN'S   PROGRESS REPORT   CERTIFICATION OF DISABILITY Claim Number:     Social Security Number:    Patient’s Name: Jose Cabrera Date of Injury: 2/7/2025   Employer:    [employer] Name of MCO (if applicable):      Patient’s Job Description/Occupation: Service Dept       Previous Injuries/Diseases/Surgeries Contributing to the Condition:  None      Diagnosis: (S90.01XD) Contusion of right ankle, subsequent encounter  (T14.8XXA) Abrasion      Related to the Industrial Injury? Yes     Explain: HPI:  Very pleasant 29-year-old male present to the clinic for follow-up regarding work-related injury.  Right ankle pain has significantly improved.  He has formed a nice scab that is healing appropriately over the medial aspect of the ankle.  He has not noted any discharge or drainage.  No surrounding redness.  Able to weight-bear and perform all movements without any limitation.  No questions or concerns at this time.      Objective Medical Findings: Constitutional: Pt is oriented to person, place, and time.  Appears well-developed and well-nourished. No distress.   Eyes: Conjunctivae are normal.   Cardiovascular: Normal rate.    Pulmonary/Chest: Effort normal.   Musculoskeletal: Right ankle: Healing scab to the medial malleolus.  No surrounding redness or signs of infection present.  No bony tenderness present.  Full and pain-free ankle range of motion.  Normal gait.  Neurological: Pt is alert and oriented to person, place, and time. Coordination normal.   Skin: Skin is warm. Pt is not diaphoretic. No erythema.   Psychiatric: Pt has a normal mood and affect.  Behavior is normal.         X   None - Discharged                         Stable  No                 Ratable  No     X   Generally Improved                         Condition Worsened                  Condition Same  May Have Suffered a Permanent Disability No     Treatment Plan:    Symptoms fully improved.  Wound is healed  nicely without any signs of infection.  Able to perform all work duties without any complication.  Discharge MMI.         No Change in Therapy                  PT/OT Prescribed                      Medication May be Used While Working        Case Management                          PT/OT Discontinued    Consultation    Further Diagnostic Studies:    Prescription(s)               X  Released to FULL DUTY /No Restrictions on (Date):  3/6/2025    Certified TOTALLY TEMPORARILY DISABLED (Indicate Dates) From:   To:      Released to RESTRICTED/Modified Duty on (Date): From:   To:    Restrictions Are:         No Sitting    No Standing    No Pulling Other:         No Bending at Waist     No Stooping     No Lifting        No Carrying     No Walking Lifting Restricted to (lbs.):          No Pushing        No Climbing     No Reaching Above Shoulders       Date of Next Visit:    Date of this Exam: 3/6/2025 Physician/Chiropractic Physician Name: Fred Cordoba P.A.-C. Physician/Chiropractic Physician Signature:  Stiven Hernandez DO MPH     [place]:  [address] [place] [address], [address] [place], [place] [address] - Telephone [phone] [place]:  [address] [place] [address], [address] [place], [place] [address] - Telephone [phone]    [email or web address]  D-39 (Rev. 10/24)

## 2025-05-16 ENCOUNTER — HOSPITAL ENCOUNTER (OUTPATIENT)
Facility: MEDICAL CENTER | Age: 30
End: 2025-05-16
Attending: UROLOGY

## 2025-05-16 PROCEDURE — 83615 LACTATE (LD) (LDH) ENZYME: CPT

## 2025-05-16 PROCEDURE — 84702 CHORIONIC GONADOTROPIN TEST: CPT

## 2025-05-16 PROCEDURE — 82105 ALPHA-FETOPROTEIN SERUM: CPT

## 2025-05-17 LAB
B-HCG SERPL-ACNC: 8.1 MIU/ML (ref 0–5)
LDH SERPL L TO P-CCNC: 223 U/L (ref 107–266)

## 2025-05-18 LAB — AFP-TM SERPL-MCNC: 1 NG/ML (ref 0–9)
